# Patient Record
Sex: FEMALE | Race: WHITE
[De-identification: names, ages, dates, MRNs, and addresses within clinical notes are randomized per-mention and may not be internally consistent; named-entity substitution may affect disease eponyms.]

---

## 2017-05-18 ENCOUNTER — HOSPITAL ENCOUNTER (OUTPATIENT)
Dept: HOSPITAL 47 - RADCTMAIN | Age: 62
Discharge: HOME | End: 2017-05-18
Payer: COMMERCIAL

## 2017-05-18 DIAGNOSIS — H92.09: Primary | ICD-10-CM

## 2017-05-18 LAB
BUN SERPL-SCNC: 22 MG/DL (ref 7–17)
NON-AFRICAN AMERICAN GFR(MDRD): >60

## 2017-05-18 PROCEDURE — 70481 CT ORBIT/EAR/FOSSA W/DYE: CPT

## 2017-05-18 PROCEDURE — 36415 COLL VENOUS BLD VENIPUNCTURE: CPT

## 2017-05-18 PROCEDURE — 82565 ASSAY OF CREATININE: CPT

## 2017-05-18 PROCEDURE — 84520 ASSAY OF UREA NITROGEN: CPT

## 2017-05-18 NOTE — CT
EXAMINATION TYPE: CT posterior fossa w con

 

DATE OF EXAM: 5/18/2017 12:11 PM

 

COMPARISON: NONE

 

HISTORY: Otalgia per order. Symptoms of severe pain and pressure for 6 to 7 weeks, side not specified
 with dizziness per patient.

 

CT DLP: 428.50 mGycm.  Automated Exposure Control for Dose Reduction was Utilized.

 

 

TECHNIQUE: CT scan of internal auditory canal is performed with IV contrast, thin cut axial images ar
e obtained, coronal reformatted images are also reviewed.  Patient is injected with 100 cc Omnipaque 
300 for the study.

 

FINDINGS: The external auditory canals are patent bilaterally. Visualized Mastoid air cells show no e
vidence of abnormal opacification bilaterally.  The middle ear ossicles are symmetric and unremarkabl
e.  There is no evidence of suspicious surrounding soft tissue density to suggest cholesteatoma.  The
 scutum is preserved bilaterally.  The cochlea and the semicircular canals are symmetric and unremark
able.  Vestibular aqueduct and internal carotid canal appear unremarkable.  

 

Temporomandibular joints are maintained bilaterally.  Visualized paranasal sinuses are grossly clear.
 Visualized portion brain parenchyma is felt within normal limits. The globes appear within normal li
mits. Intraconal fat is preserved bilaterally.

 

IMPRESSION: Unremarkable study. No significant finding is seen to account for patient's symptoms.

## 2017-11-08 ENCOUNTER — HOSPITAL ENCOUNTER (OUTPATIENT)
Dept: HOSPITAL 47 - RADMAMWWP | Age: 62
Discharge: HOME | End: 2017-11-08
Payer: COMMERCIAL

## 2017-11-08 DIAGNOSIS — Z12.31: Primary | ICD-10-CM

## 2017-11-08 DIAGNOSIS — Z80.3: ICD-10-CM

## 2017-11-08 PROCEDURE — 77063 BREAST TOMOSYNTHESIS BI: CPT

## 2017-11-09 NOTE — MM
Reason for exam: screening  (asymptomatic).

Last mammogram was performed 1 year ago.



History:

Patient is postmenopausal.

Family history of breast cancer in maternal aunt at age 60 and breast cancer in 

maternal grandmother at age 65.

Benign left mammotome panel of the left breast, April 16, 2007.  Reductions of 

both breasts, 2003.



Physical Findings:

A clinical breast exam by your physician is recommended on an annual basis and 

results should be correlated with mammographic findings.



MG 3D Screening Mammo W/Cad

Bilateral CC and MLO view(s) were taken.

Prior study comparison: October 26, 2016, bilateral MG 3d screening mammo w/cad.  

October 13, 2015, bilateral MG diagnostic mammo w CAD JUDY.

There are scattered fibroglandular densities.

Finding: There are round calcifications in both breasts. Previous mammotome biopsy

in the left breast, stable x 2-3 same site. There is no discrete abnormality.





ASSESSMENT: Benign, BI-RAD 2



RECOMMENDATION:

Routine screening mammogram of both breasts in 1 year.

Manage on a clinical basis with regard to nipple discharge. Consider further work 

up or ductogram if spontaneous or bloody.

## 2018-03-12 ENCOUNTER — HOSPITAL ENCOUNTER (OUTPATIENT)
Dept: HOSPITAL 47 - RADUSWWP | Age: 63
Discharge: HOME | End: 2018-03-12
Payer: COMMERCIAL

## 2018-03-12 DIAGNOSIS — R93.8: Primary | ICD-10-CM

## 2018-03-12 DIAGNOSIS — Z87.440: ICD-10-CM

## 2018-03-12 PROCEDURE — 76770 US EXAM ABDO BACK WALL COMP: CPT

## 2018-03-13 NOTE — US
EXAMINATION TYPE: US kidneys/renal and bladder

 

DATE OF EXAM: 3/12/2018

 

COMPARISON: NONE

 

CLINICAL HISTORY: N39.0 FREQ URINATION.

 

EXAM MEASUREMENTS:

 

Right Kidney:  11.1 x 4.6 x  cm

Left Kidney: 10.9 x 5.1 x 4.8 cm

 

 

Patient of large body habitus

 

Right Kidney: Inferior pole obscured by bowel gas, cyst noted measuring 1.5 x 1.6 x 1.5cm 

Left Kidney: Inferior pole obscured by bowel gas  

Bladder: Sonolucent.

**Bilateral Jets seen: No

**Normal Post Void Residual: 234.7, abnormal, normal range <50ml

 

 

 

IMPRESSION:

 

1. Large post void residual.

2. Simple appearing cyst superior pole right kidney, differential would include peripelvic cyst

## 2018-05-08 ENCOUNTER — HOSPITAL ENCOUNTER (OUTPATIENT)
Dept: HOSPITAL 47 - EC | Age: 63
Setting detail: OBSERVATION
LOS: 1 days | Discharge: HOME | End: 2018-05-09
Payer: COMMERCIAL

## 2018-05-08 DIAGNOSIS — E66.9: ICD-10-CM

## 2018-05-08 DIAGNOSIS — I10: ICD-10-CM

## 2018-05-08 DIAGNOSIS — Z86.73: ICD-10-CM

## 2018-05-08 DIAGNOSIS — R07.89: Primary | ICD-10-CM

## 2018-05-08 DIAGNOSIS — Z82.49: ICD-10-CM

## 2018-05-08 DIAGNOSIS — Z88.1: ICD-10-CM

## 2018-05-08 DIAGNOSIS — Z79.51: ICD-10-CM

## 2018-05-08 DIAGNOSIS — Z79.899: ICD-10-CM

## 2018-05-08 DIAGNOSIS — Z80.0: ICD-10-CM

## 2018-05-08 DIAGNOSIS — E78.5: ICD-10-CM

## 2018-05-08 DIAGNOSIS — Z79.82: ICD-10-CM

## 2018-05-08 DIAGNOSIS — Z87.891: ICD-10-CM

## 2018-05-08 DIAGNOSIS — Z83.3: ICD-10-CM

## 2018-05-08 LAB
ALBUMIN SERPL-MCNC: 4.5 G/DL (ref 3.5–5)
ALP SERPL-CCNC: 95 U/L (ref 38–126)
ALT SERPL-CCNC: 23 U/L (ref 9–52)
ANION GAP SERPL CALC-SCNC: 15 MMOL/L
APTT BLD: 28.9 SEC (ref 22–30)
AST SERPL-CCNC: 21 U/L (ref 14–36)
BASOPHILS # BLD AUTO: 0 K/UL (ref 0–0.2)
BASOPHILS NFR BLD AUTO: 0 %
BUN SERPL-SCNC: 13 MG/DL (ref 7–17)
CALCIUM SPEC-MCNC: 10.4 MG/DL (ref 8.4–10.2)
CHLORIDE SERPL-SCNC: 103 MMOL/L (ref 98–107)
CK SERPL-CCNC: 62 U/L (ref 30–135)
CK SERPL-CCNC: 79 U/L (ref 30–135)
CO2 SERPL-SCNC: 26 MMOL/L (ref 22–30)
EOSINOPHIL # BLD AUTO: 0.2 K/UL (ref 0–0.7)
EOSINOPHIL NFR BLD AUTO: 2 %
ERYTHROCYTE [DISTWIDTH] IN BLOOD BY AUTOMATED COUNT: 4.34 M/UL (ref 3.8–5.4)
ERYTHROCYTE [DISTWIDTH] IN BLOOD: 13.5 % (ref 11.5–15.5)
GLUCOSE SERPL-MCNC: 92 MG/DL (ref 74–99)
HCT VFR BLD AUTO: 39 % (ref 34–46)
HGB BLD-MCNC: 13.3 GM/DL (ref 11.4–16)
INR PPP: 1 (ref ?–1.2)
LYMPHOCYTES # SPEC AUTO: 2.2 K/UL (ref 1–4.8)
LYMPHOCYTES NFR SPEC AUTO: 21 %
MAGNESIUM SPEC-SCNC: 2.1 MG/DL (ref 1.6–2.3)
MCH RBC QN AUTO: 30.6 PG (ref 25–35)
MCHC RBC AUTO-ENTMCNC: 34 G/DL (ref 31–37)
MCV RBC AUTO: 90 FL (ref 80–100)
MONOCYTES # BLD AUTO: 0.5 K/UL (ref 0–1)
MONOCYTES NFR BLD AUTO: 4 %
NEUTROPHILS # BLD AUTO: 7.4 K/UL (ref 1.3–7.7)
NEUTROPHILS NFR BLD AUTO: 71 %
PLATELET # BLD AUTO: 262 K/UL (ref 150–450)
POTASSIUM SERPL-SCNC: 4.3 MMOL/L (ref 3.5–5.1)
PROT SERPL-MCNC: 7.7 G/DL (ref 6.3–8.2)
PT BLD: 9.9 SEC (ref 9–12)
SODIUM SERPL-SCNC: 144 MMOL/L (ref 137–145)
TROPONIN I SERPL-MCNC: <0.012 NG/ML (ref 0–0.03)
TROPONIN I SERPL-MCNC: <0.012 NG/ML (ref 0–0.03)
WBC # BLD AUTO: 10.5 K/UL (ref 3.8–10.6)

## 2018-05-08 PROCEDURE — 80053 COMPREHEN METABOLIC PANEL: CPT

## 2018-05-08 PROCEDURE — 93306 TTE W/DOPPLER COMPLETE: CPT

## 2018-05-08 PROCEDURE — 71046 X-RAY EXAM CHEST 2 VIEWS: CPT

## 2018-05-08 PROCEDURE — 93005 ELECTROCARDIOGRAM TRACING: CPT

## 2018-05-08 PROCEDURE — 85730 THROMBOPLASTIN TIME PARTIAL: CPT

## 2018-05-08 PROCEDURE — 96366 THER/PROPH/DIAG IV INF ADDON: CPT

## 2018-05-08 PROCEDURE — 99285 EMERGENCY DEPT VISIT HI MDM: CPT

## 2018-05-08 PROCEDURE — 36415 COLL VENOUS BLD VENIPUNCTURE: CPT

## 2018-05-08 PROCEDURE — 96365 THER/PROPH/DIAG IV INF INIT: CPT

## 2018-05-08 PROCEDURE — 80061 LIPID PANEL: CPT

## 2018-05-08 PROCEDURE — 96376 TX/PRO/DX INJ SAME DRUG ADON: CPT

## 2018-05-08 PROCEDURE — 85025 COMPLETE CBC W/AUTO DIFF WBC: CPT

## 2018-05-08 PROCEDURE — 83735 ASSAY OF MAGNESIUM: CPT

## 2018-05-08 PROCEDURE — 82553 CREATINE MB FRACTION: CPT

## 2018-05-08 PROCEDURE — 84484 ASSAY OF TROPONIN QUANT: CPT

## 2018-05-08 PROCEDURE — 93017 CV STRESS TEST TRACING ONLY: CPT

## 2018-05-08 PROCEDURE — 78452 HT MUSCLE IMAGE SPECT MULT: CPT

## 2018-05-08 PROCEDURE — 82550 ASSAY OF CK (CPK): CPT

## 2018-05-08 PROCEDURE — 85610 PROTHROMBIN TIME: CPT

## 2018-05-08 RX ADMIN — NITROGLYCERIN SCH INCH: 20 OINTMENT TOPICAL at 17:14

## 2018-05-08 RX ADMIN — ACETAMINOPHEN PRN MG: 325 TABLET, FILM COATED ORAL at 17:13

## 2018-05-08 NOTE — XR
EXAMINATION TYPE: XR chest 2V

 

DATE OF EXAM: 5/8/2018

 

COMPARISON: NONE

 

HISTORY: Chest pain

 

TECHNIQUE:  Frontal and lateral views of the chest are obtained.

 

FINDINGS:  

 

There is no focal air space opacity.

 

No evidence for pneumothorax.  No pleural effusion.

 

The cardiac silhouette size is within normal limits.

 

The osseous structures are grossly intact.

 

IMPRESSION:  

 

1.  No acute cardiopulmonary process.

## 2018-05-08 NOTE — ED
General Adult HPI





- General


Chief complaint: Chest Pain


Stated complaint: Chest pain


Time Seen by Provider: 05/08/18 12:40


Source: patient, RN notes reviewed


Mode of arrival: ambulatory


Limitations: no limitations





- History of Present Illness


Initial comments: 





This is a 62-year-old female presents emergency room with a past medical 

history significant for a stroke and hypertension.  Patient presents today 

after seeing her doctor earlier.  Patient comes in with chest pain which 

radiates to her neck arm and back she states it started on Saturday and it 

appears to be getting worse.  Patient does not notice it getting worse with 

exertion she does notice a getting worse when she stands.  Patient denies any 

shortness of breath.  Patient denies any diaphoresis.  Patient denies any 

nausea.  Patient denies abdominal pain patient denies vomiting or diarrhea.  

Patient denies any recent fever chills or cough.  Patient denies being 

lightheaded or dizzy.  Patient denies any headache patient denies numbness or 

weakness.





- Related Data


 Home Medications











 Medication  Instructions  Recorded  Confirmed


 


Aspirin 325 mg PO DAILY 07/04/14 05/08/18


 


Atorvastatin Calcium [Lipitor] 10 mg PO DAILY 07/04/14 05/08/18


 


Cetirizine HCl [Zyrtec] 10 mg PO DAILY 07/04/14 05/08/18


 


Losartan/Hydrochlorothiazide 1 tab PO DAILY 07/04/14 05/08/18





[Hyzaar 50-12.5 Tablet]   


 


Ranitidine HCl [Zantac] 150 mg PO HS 07/04/14 05/08/18


 


Albuterol Nebulized [Ventolin 2.5 mg INHALATION RT-Q6H PRN 05/08/18 05/08/18





Nebulized]   


 


Cranberry Fruit Extract [Cranberry] 200 mg PO DAILY 05/08/18 05/08/18


 


Fluticasone Nasal Spray [Flonase 2 spr EA NOSTRIL DAILY 05/08/18 05/08/18





Nasal Spray]   


 


Furosemide [Lasix] 20 mg PO DAILY PRN 05/08/18 05/08/18


 


Glucosamine-Chondr 500-400Mg 1 tab PO DAILY 05/08/18 05/08/18





[Glucosamine-Chondr-MSM Tab]   


 


Ibuprofen [Motrin] 800 mg PO TID PRN 05/08/18 05/08/18


 


Meclizine [Antivert] 12.5 - 25 mg PO Q6H PRN 05/08/18 05/08/18











 Allergies











Allergy/AdvReac Type Severity Reaction Status Date / Time


 


nitrofurantoin Allergy Severe Anaphylaxis Verified 05/08/18 13:09





[From Macrobid]     


 


nitrofurantoin Allergy Severe Anaphylaxis Verified 05/08/18 13:09





macrocrystalline     





[From Macrobid]     














Review of Systems


ROS Statement: 


Those systems with pertinent positive or pertinent negative responses have been 

documented in the HPI.





ROS Other: All systems not noted in ROS Statement are negative.





Past Medical History


Past Medical History: GERD/Reflux, Hyperlipidemia, Hypertension


History of Any Multi-Drug Resistant Organisms: None Reported


Past Surgical History: Orthopedic Surgery, Tubal Ligation, Uterine Ablation


Past Psychological History: No Psychological Hx Reported


Smoking Status: Former smoker


Past Alcohol Use History: Occasional


Past Drug Use History: None Reported





General Exam





- General Exam Comments


Initial Comments: 





GENERAL:


Patient is well-developed and well-nourished.  Patient is nontoxic and well-

hydrated and is in mild distress.





ENT:


Neck is soft and supple.  No significant lymphadenopathy is noted.  Oropharynx 

is clear.  Moist mucous membranes.  Neck has full range of motion without 

eliciting any pain. 





EYES:


The sclera were anicteric and conjunctiva were pink and moist.  Extraocular 

movements were intact and pupils were equal round and reactive to light.  

Eyelids were unremarkable.





PULMONARY:


Unlabored respirations.  Good breath sounds bilaterally.  No audible rales 

rhonchi or wheezing was noted.





CARDIOVASCULAR:


There is a regular rate and rhythm without any murmurs gallops or rubs.  





ABDOMEN:


Soft and nontender with normal bowel sounds.  No palpable organomegaly was 

noted.  There is no palpable pulsatile mass.





SKIN:


Skin is clear with no lesions or rashes and otherwise unremarkable.





NEUROLOGIC:


Patient is alert and oriented x3.  Cranial nerves II through XII are grossly 

intact.  Motor and sensory are also intact.  Normal speech, volume and content.

  Symmetrical smile. 





MUSCULOSKELETAL:


Normal extremities with adequate strength and full range of motion.  Slight 

edema bilaterally more so on the right





LYMPHATICS:


No significant lymphadenopathy is noted





PSYCHIATRIC:


Normal psychiatric evaluation.  Normal interpersonal interactions appears 

functionally intact in deals appropriately with others.  No signs of 

depression.  


Limitations: no limitations





Course


 Vital Signs











  05/08/18 05/08/18 05/08/18





  12:40 13:13 14:12


 


Temperature 98.8 F  


 


Pulse Rate 84 79 78


 


Respiratory 18 18 18





Rate   


 


Blood Pressure 181/85 179/92 151/82


 


O2 Sat by Pulse 98 97 99





Oximetry   














Medical Decision Making





- Medical Decision Making





EKG shows normal sinus rhythm at 84 bpm ND interval is 160 QRS is 98 QT 

interval 400 QTC is 472.  Patient's EKG shows no ST segment elevation or 

depression or T wave abnormalities are noted.





Patient's chest pain improved with the Nitropaste and aspirin.





Patient's chest x-ray shows no acute abnormality.





I started the patient on heparin because her symptoms are indicative of 

unstable angina.





I spoke with Dr. Mcgrath he agreed to admit the patient I admitted the patient I 

wrote admitting orders and I consult cardiology I continued heparin Nitropaste 

and aspirin on the floor.





- Lab Data


Result diagrams: 


 05/08/18 13:05





 05/08/18 13:05


 Lab Results











  05/08/18 05/08/18 05/08/18 Range/Units





  13:05 13:05 13:05 


 


WBC   10.5   (3.8-10.6)  k/uL


 


RBC   4.34   (3.80-5.40)  m/uL


 


Hgb   13.3   (11.4-16.0)  gm/dL


 


Hct   39.0   (34.0-46.0)  %


 


MCV   90.0   (80.0-100.0)  fL


 


MCH   30.6   (25.0-35.0)  pg


 


MCHC   34.0   (31.0-37.0)  g/dL


 


RDW   13.5   (11.5-15.5)  %


 


Plt Count   262   (150-450)  k/uL


 


Neutrophils %   71   %


 


Lymphocytes %   21   %


 


Monocytes %   4   %


 


Eosinophils %   2   %


 


Basophils %   0   %


 


Neutrophils #   7.4   (1.3-7.7)  k/uL


 


Lymphocytes #   2.2   (1.0-4.8)  k/uL


 


Monocytes #   0.5   (0-1.0)  k/uL


 


Eosinophils #   0.2   (0-0.7)  k/uL


 


Basophils #   0.0   (0-0.2)  k/uL


 


PT     (9.0-12.0)  sec


 


INR     (<1.2)  


 


APTT     (22.0-30.0)  sec


 


Sodium    144  (137-145)  mmol/L


 


Potassium    4.3  (3.5-5.1)  mmol/L


 


Chloride    103  ()  mmol/L


 


Carbon Dioxide    26  (22-30)  mmol/L


 


Anion Gap    15  mmol/L


 


BUN    13  (7-17)  mg/dL


 


Creatinine    0.66  (0.52-1.04)  mg/dL


 


Est GFR (CKD-EPI)AfAm    >90  (>60 ml/min/1.73 sqM)  


 


Est GFR (CKD-EPI)NonAf    >90  (>60 ml/min/1.73 sqM)  


 


Glucose    92  (74-99)  mg/dL


 


Calcium    10.4 H  (8.4-10.2)  mg/dL


 


Magnesium    2.1  (1.6-2.3)  mg/dL


 


Total Bilirubin    0.6  (0.2-1.3)  mg/dL


 


AST    21  (14-36)  U/L


 


ALT    23  (9-52)  U/L


 


Alkaline Phosphatase    95  ()  U/L


 


Total Creatine Kinase  79    ()  U/L


 


CK-MB (CK-2)  0.6    (0.0-2.4)  ng/mL


 


CK-MB (CK-2) Rel Index  0.8    


 


Troponin I  <0.012    (0.000-0.034)  ng/mL


 


Total Protein    7.7  (6.3-8.2)  g/dL


 


Albumin    4.5  (3.5-5.0)  g/dL














  05/08/18 Range/Units





  13:05 


 


WBC   (3.8-10.6)  k/uL


 


RBC   (3.80-5.40)  m/uL


 


Hgb   (11.4-16.0)  gm/dL


 


Hct   (34.0-46.0)  %


 


MCV   (80.0-100.0)  fL


 


MCH   (25.0-35.0)  pg


 


MCHC   (31.0-37.0)  g/dL


 


RDW   (11.5-15.5)  %


 


Plt Count   (150-450)  k/uL


 


Neutrophils %   %


 


Lymphocytes %   %


 


Monocytes %   %


 


Eosinophils %   %


 


Basophils %   %


 


Neutrophils #   (1.3-7.7)  k/uL


 


Lymphocytes #   (1.0-4.8)  k/uL


 


Monocytes #   (0-1.0)  k/uL


 


Eosinophils #   (0-0.7)  k/uL


 


Basophils #   (0-0.2)  k/uL


 


PT  9.9  (9.0-12.0)  sec


 


INR  1.0  (<1.2)  


 


APTT  28.9  (22.0-30.0)  sec


 


Sodium   (137-145)  mmol/L


 


Potassium   (3.5-5.1)  mmol/L


 


Chloride   ()  mmol/L


 


Carbon Dioxide   (22-30)  mmol/L


 


Anion Gap   mmol/L


 


BUN   (7-17)  mg/dL


 


Creatinine   (0.52-1.04)  mg/dL


 


Est GFR (CKD-EPI)AfAm   (>60 ml/min/1.73 sqM)  


 


Est GFR (CKD-EPI)NonAf   (>60 ml/min/1.73 sqM)  


 


Glucose   (74-99)  mg/dL


 


Calcium   (8.4-10.2)  mg/dL


 


Magnesium   (1.6-2.3)  mg/dL


 


Total Bilirubin   (0.2-1.3)  mg/dL


 


AST   (14-36)  U/L


 


ALT   (9-52)  U/L


 


Alkaline Phosphatase   ()  U/L


 


Total Creatine Kinase   ()  U/L


 


CK-MB (CK-2)   (0.0-2.4)  ng/mL


 


CK-MB (CK-2) Rel Index   


 


Troponin I   (0.000-0.034)  ng/mL


 


Total Protein   (6.3-8.2)  g/dL


 


Albumin   (3.5-5.0)  g/dL














Disposition


Clinical Impression: 


 Unstable angina pectoris





Disposition: ADMITTED AS IP TO THIS HOSP


Referrals: 


Amelia Cherry MD [Primary Care Provider] - 1-2 days


Time of Disposition: 14:26

## 2018-05-09 VITALS — DIASTOLIC BLOOD PRESSURE: 65 MMHG | SYSTOLIC BLOOD PRESSURE: 142 MMHG | HEART RATE: 80 BPM | TEMPERATURE: 97.4 F

## 2018-05-09 VITALS — RESPIRATION RATE: 18 BRPM

## 2018-05-09 LAB
CHOLEST SERPL-MCNC: 121 MG/DL (ref ?–200)
CK SERPL-CCNC: 59 U/L (ref 30–135)
HDLC SERPL-MCNC: 49 MG/DL (ref 40–60)
LDLC SERPL CALC-MCNC: 53 MG/DL (ref 0–99)
TRIGL SERPL-MCNC: 96 MG/DL (ref ?–150)
TROPONIN I SERPL-MCNC: <0.012 NG/ML (ref 0–0.03)

## 2018-05-09 RX ADMIN — NITROGLYCERIN SCH: 20 OINTMENT TOPICAL at 00:14

## 2018-05-09 RX ADMIN — ACETAMINOPHEN PRN MG: 325 TABLET, FILM COATED ORAL at 06:22

## 2018-05-09 RX ADMIN — NITROGLYCERIN SCH: 20 OINTMENT TOPICAL at 05:03

## 2018-05-09 NOTE — EST
EXERCISE STRESS



DATE OF SERVICE:

05/09/2018



AGE:

62



SEX:

Female.



HT:

70"



WT:

275 pounds.



PROTOCOL:

Lexiscan Cardiolite.



STAGE:



DURATION OF EXERCISE:



HEART RATE REST:

76



BLOOD PRESSURE REST:

138/91



MAXIMUM HEART RATE ACHIEVED:

100



MAXIMUM BLOOD PRESSURE:

156/90



85% MPHR:

134



100% MPHR:

158



METS:



INDICATIONS:

Chest pain.



CLINICAL INFORMATION:

STRESS DATA:  Pre-testing physical examination showed a heart rate of 76, pressure

138/91 mmHg. Baseline EKG showed sinus mechanism. Lexiscan 0.4 mg was given to the

patient over 15 seconds per protocol. Her maximum heart rate was 100 beats per minute

and maximum pressure was 156/90 mmHg. Clinically the patient did not have any symptoms

of chest pain or discomfort and the EKG did not show any significant ST or T-wave

abnormalities consistent with ischemia.



CONCLUSION:

1. Non-diagnostic electrocardiogram stress testing in response to Lexiscan.

2. Please follow up on the Cardiolite portion on separate report from Radiology

    Department.





MMODL / IJN: [id number] / Job#: [id number]

## 2018-05-09 NOTE — P.CRDCN
History of Present Illness


Consult date: 05/09/18


History of present illness: 


Mrs. Mcclendon is a pleasant 62-year-old  female past medical history 

significant for TIA, dyslipidemia, hypertension and obesity. She denies history 

of coronary artery disease. Her father had bypass in his late 60's/early 70's. 

She has never seen a cardiologist for any reason. We have been asked to see her 

in consultation for chest pain. She states she woke up Saturday morning with a 

pain in the precordial region that was described as an ache. She felt it was 

similar to a gas type pain, which she has experienced in the past. The 

sensation remained localized to the precordial region all of Saturday. However, 

starting Sunday the pain started to radiate down her left arm, through to her 

back, into her neck and left jaw. She denies associated shortness of breath, 

dizziness, palpitations, nausea, vomiting or diaphoresis. She went to see her 

PCP and was sent to the hospital for evaluation. When she arrived here she was 

having the pain and was given aspirin and nitropaste which seemed to relieve 

the pain. She has had no further symptoms of chest pain since admission. 





EKG reveals sinus mechanism with non-specific T-wave abnormalities. 


Chest xray is negative for an acute cardiopulmonary process. 


Laboratory data reviewed,  hemoglobin 13.3, platelets 262, sodium 144, 

potassium 4.3, creatinine 0.66, cardiac enzymes negative 3, LDL 53. 


Current cardiac medications include Hyzaar 50/12.5 mg daily, aspirin 325 mg 

daily, Lasix 20 mg daily as needed and atorvastatin 10 mg daily.








Review of Systems





At the time of my exam:


CONSTITUTIONAL: Denies fever. Denies chills.


EYES: Denies blurred vision. Denies vision changes. Denies eye pain.


EARS, NOSE, MOUTH & THROAT: Denies headache. Denies sore throat. Denies ear 

pain.


CARDIOVASCULAR: Denies chest pain. Denies shortness of breath. Denies 

orthopnea. Denies PND. Denies palpitations.


RESPIRATORY: Denies cough. 


GASTROINTESTINAL: Denies abdominal pain. Denies diarrhea. Denies constipation. 

Denies nausea. Denies vomiting.


MUSCULOSKELETAL: Denies myalgias.


INTEGUMENTARY: Denies pruitis. Denies rash.


NEUROLOGIC: Denies numbness. Denies tingling. Denies weakness.


PSYCHIATRIC: Denies anxiety. Denies depression.


ENDOCRINE: Denies fatigue. Denies weight change. Denies polydipsia. Denies 

polyurina.


GENITOURINARY: Denies burning, hematuria or urgency with micturation.


HEMATOLOGIC: Denies history of anemia. Denies bleeding. 








Past Medical History


Past Medical History: CVA/TIA, GERD/Reflux, Hyperlipidemia, Hypertension


Additional Past Medical History / Comment(s): for past week"hard time swallowing

-food sticks", uti's, past sciatica-had epidural inj x1, upper lt bridge, 

arthritis rt knee,pyloric stenosis as infant(sx done), tia 4-5 years ago, pt 

stated they put me on li[itor as preventative


History of Any Multi-Drug Resistant Organisms: None Reported


Past Surgical History: Breast Surgery, Orthopedic Surgery, Tubal Ligation, 

Uterine Ablation


Additional Past Surgical History / Comment(s): messi knee arthroscopy,lumbar 

epidural inj, coposcopy, leep procedure, colonoscopy, d&c x2, benign lesions 

removed shoulder/neck, breast reduction


Past Anesthesia/Blood Transfusion Reactions: Postoperative Nausea & Vomiting (

PONV)


Additional Past Anesthesia/Blood Transfusion Reaction / Comment(s): difficulty 

waking, mild clausterphobia


Smoking Status: Former smoker





- Past Family History


  ** Father


Family Medical History: Cancer





  ** Mother


Family Medical History: Diabetes Mellitus


Additional Family Medical History / Comment(s): pancreatic cancer





Medications and Allergies


 Home Medications











 Medication  Instructions  Recorded  Confirmed  Type


 


Aspirin 325 mg PO DAILY 07/04/14 05/08/18 History


 


Atorvastatin Calcium [Lipitor] 10 mg PO DAILY 07/04/14 05/08/18 History


 


Cetirizine HCl [Zyrtec] 10 mg PO DAILY 07/04/14 05/08/18 History


 


Losartan/Hydrochlorothiazide 1 tab PO DAILY 07/04/14 05/08/18 History





[Hyzaar 50-12.5 Tablet]    


 


Ranitidine HCl [Zantac] 150 mg PO HS 07/04/14 05/08/18 History


 


Albuterol Nebulized [Ventolin 2.5 mg INHALATION RT-Q6H PRN 05/08/18 05/08/18 

History





Nebulized]    


 


Cranberry Fruit Extract [Cranberry] 200 mg PO DAILY 05/08/18 05/08/18 History


 


Fluticasone Nasal Spray [Flonase 2 spr EA NOSTRIL DAILY 05/08/18 05/08/18 

History





Nasal Spray]    


 


Furosemide [Lasix] 20 mg PO DAILY PRN 05/08/18 05/08/18 History


 


Glucosamine-Chondr 500-400Mg 1 tab PO DAILY 05/08/18 05/08/18 History





[Glucosamine-Chondr-MSM Tab]    


 


Ibuprofen [Motrin] 800 mg PO TID PRN 05/08/18 05/08/18 History


 


Meclizine [Antivert] 12.5 - 25 mg PO Q6H PRN 05/08/18 05/08/18 History











 Allergies











Allergy/AdvReac Type Severity Reaction Status Date / Time


 


nitrofurantoin Allergy Severe Anaphylaxis Verified 05/08/18 13:09





[From Macrobid]     


 


nitrofurantoin Allergy Severe Anaphylaxis Verified 05/08/18 13:09





macrocrystalline     





[From Macrobid]     














Physical Exam


Vitals: 


 Vital Signs











  Temp Pulse Pulse Resp BP BP Pulse Ox


 


 05/09/18 07:25  97.5 F L   67  18   117/67  98


 


 05/09/18 03:57    60  16   


 


 05/09/18 03:39  97.7 F   65  16   120/68  98


 


 05/08/18 23:38  98.1 F   70  16   110/62  97


 


 05/08/18 23:18    71  16   


 


 05/08/18 20:00    68  16   


 


 05/08/18 19:32  97.9 F   82  16   117/60  95


 


 05/08/18 16:00     18   


 


 05/08/18 15:30  97.7 F   87  18   145/59  95


 


 05/08/18 14:12   78   18  151/82   99


 


 05/08/18 13:13   79   18  179/92   97


 


 05/08/18 12:40  98.8 F  84   18  181/85   98








 Intake and Output











 05/08/18 05/09/18 05/09/18





 22:59 06:59 14:59


 


Intake Total 121.667  


 


Balance 121.667  


 


Intake:   


 


  Intake, IV Titration 121.667  





  Amount   


 


    Heparin Sodium,Porcine/ 121.667  





    D5w Pmx 25,000 unit In   





    Dextrose/Water 1 500ml.   





    bag @ 7.96 UNITS/KG/HR 20   





    mls/hr IV .Q24H ALESSANDRA Rx#:   





    [id number]   


 


Other:   


 


  Voiding Method Toilet Toilet 


 


  # Voids  3 


 


  Weight 124.8 kg  














Blood pressure 117/67 heart rate 67 afebrile maintaining oxygen saturation on 

room air


GENERAL: This is a 62-year-old  female in no apparent distress at the 

time of my examination.  Obese.


HEENT: Head is atraumatic, normocephalic. Pupils are equal, round. Sclerae 

anicteric. Conjunctivae are clear. Mucous membranes of the mouth are moist. 

Neck is supple. There is no jugular venous distention. No carotid bruit is 

heard.


LUNGS: Clear to auscultation no wheezes, rales or rhonchi. No chest wall 

tenderness is noted on palpation or with deep breathing.


HEART: Regular rate and rhythm without murmurs, rubs or gallops. S1 and S2 

heard.


ABDOMEN: Soft, nontender. Bowel sounds are heard. No organomegaly noted.


EXTREMITIES: No evidence of peripheral edema and no calf tenderness noted.


VASCULAR: Radial and dorsalis pedis pulses palpated, no evidence of clubbing.  


NEUROLOGIC: Patient is awake, alert and oriented x3.


 








Results





 05/08/18 13:05





 05/08/18 13:05


 Cardiac Enzymes











  05/08/18 05/08/18 05/08/18 Range/Units





  13:05 13:05 19:58 


 


AST   21   (14-36)  U/L


 


CK-MB (CK-2)  0.6   0.3  (0.0-2.4)  ng/mL


 


Troponin I  <0.012   <0.012  (0.000-0.034)  ng/mL














  05/09/18 Range/Units





  00:12 


 


AST   (14-36)  U/L


 


CK-MB (CK-2)  0.4  (0.0-2.4)  ng/mL


 


Troponin I  <0.012  (0.000-0.034)  ng/mL








 Coagulation











  05/08/18 05/08/18 05/09/18 Range/Units





  13:05 19:58 03:22 


 


PT  9.9    (9.0-12.0)  sec


 


APTT  28.9  34.6 H  49.3 H  (22.0-30.0)  sec








 Lipids











  05/09/18 Range/Units





  03:22 


 


Triglycerides  96  (<150)  mg/dL


 


Cholesterol  121  (<200)  mg/dL


 


HDL Cholesterol  49  (40-60)  mg/dL








 CBC











  05/08/18 Range/Units





  13:05 


 


WBC  10.5  (3.8-10.6)  k/uL


 


RBC  4.34  (3.80-5.40)  m/uL


 


Hgb  13.3  (11.4-16.0)  gm/dL


 


Hct  39.0  (34.0-46.0)  %


 


Plt Count  262  (150-450)  k/uL








 Comprehensive Metabolic Panel











  05/08/18 Range/Units





  13:05 


 


Sodium  144  (137-145)  mmol/L


 


Potassium  4.3  (3.5-5.1)  mmol/L


 


Chloride  103  ()  mmol/L


 


Carbon Dioxide  26  (22-30)  mmol/L


 


BUN  13  (7-17)  mg/dL


 


Creatinine  0.66  (0.52-1.04)  mg/dL


 


Glucose  92  (74-99)  mg/dL


 


Calcium  10.4 H  (8.4-10.2)  mg/dL


 


AST  21  (14-36)  U/L


 


ALT  23  (9-52)  U/L


 


Alkaline Phosphatase  95  ()  U/L


 


Total Protein  7.7  (6.3-8.2)  g/dL


 


Albumin  4.5  (3.5-5.0)  g/dL








 Current Medications











Generic Name Dose Route Start Last Admin





  Trade Name Freq  PRN Reason Stop Dose Admin


 


Acetaminophen  650 mg  05/08/18 16:52  05/09/18 06:22





  Tylenol Tab  PO   650 mg





  Q6HR PRN   Administration





  Fever and/ or Pain   


 


Albuterol Sulfate  2.5 mg  05/08/18 20:47  





  Ventolin Nebulized  INHALATION   





  RT-Q6H PRN   





  Shortness Of Breath   


 


Aspirin  325 mg  05/09/18 09:00  





  Aspirin  PO   





  DAILY [facility]   


 


Atorvastatin Calcium  10 mg  05/08/18 21:31  05/08/18 22:25





  Lipitor  PO   10 mg





  HS ALESSANDRA   Administration


 


Famotidine  20 mg  05/08/18 21:00  05/08/18 22:25





  Pepcid  PO   Not Given





  HS ALESSANDRA   


 


Fluticasone Propionate  2 spray  05/09/18 09:00  





  Flonase Nasal Spray  EA NOSTRIL   





  DAILY [facility]   


 


Furosemide  20 mg  05/08/18 20:47  





  Lasix  PO   





  DAILY PRN   





  SWELLING   


 


HCTZ/Losartan Potassium  1 each  05/09/18 09:00  





  Hyzaar 50-12.5  PO   





  DAILY [facility]   


 


Heparin Sodium/Dextrose 25,000  500 mls @ 20 mls/hr  05/08/18 14:30  05/08/18 20

:50





  unit/ IV Solution  IV   10.94 units/kg/hr





  .Q24H ALESSANDRA   27.5 mls/hr





  Protocol   Titration





  7.96 UNITS/KG/HR   


 


Ibuprofen  800 mg  05/08/18 20:47  05/08/18 21:55





  Motrin  PO   800 mg





  TID PRN   Administration





  Pain   


 


Loratadine  10 mg  05/09/18 09:00  





  Claritin  PO   





  DAILY ALESSANDRA   


 


Meclizine HCl  25 mg  05/08/18 20:47  





  Antivert  PO   





  Q6H PRN   





  Vertigo   


 


Nitroglycerin  1 inch  05/08/18 18:00  05/09/18 05:03





  Nitro-Bid Oint  TOPICAL   Not Given





  Q6HR ALESSANDRA   


 


Nitroglycerin  0.4 mg  05/08/18 14:26  





  Nitrostat  SUBLINGUAL   





  Q5M PRN   





  Chest Pain   


 


Zolpidem Tartrate  5 mg  05/08/18 20:42  





  Ambien  PO   





  HS PRN   





  Insomnia   








 Intake and Output











 05/08/18 05/09/18 05/09/18





 22:59 06:59 14:59


 


Intake Total 121.667  


 


Balance 121.667  


 


Intake:   


 


  Intake, IV Titration 121.667  





  Amount   


 


    Heparin Sodium,Porcine/ 121.667  





    D5w Pmx 25,000 unit In   





    Dextrose/Water 1 500ml.   





    bag @ 7.96 UNITS/KG/HR 20   





    mls/hr IV .Q24H ALESSANDRA Rx#:   





    [id number]   


 


Other:   


 


  Voiding Method Toilet Toilet 


 


  # Voids  3 


 


  Weight 124.8 kg  








 





 05/08/18 13:05 





 05/08/18 13:05 











Assessment and Plan


Assessment: 





ASSESSMENT


1.  Chest pain, atypical.  An acute coronary event has been ruled out.


2.  Hypertension


3.  Dyslipidemia


4.  History of TIA


5.  Obesity


6.  Family history of coronary artery disease





PLAN


Obtain 2-D echocardiogram and Doppler study to assess cardiac structure and 

function.


Perform Lexiscan stress test to assess for stress-induced reversible cardiac 

ischemia


If above diagnostics testing is normal she is stable from a cardiac perspective.


Follow-up with Dr. Ortega in 2-3 weeks.


Thank you kindly for this consultation.





Nurse Practitioner note has been reviewed, I agree with a documented findings 

and plan of care.  Patient was seen and examined.

## 2018-05-09 NOTE — P.DS
Providers


Date of admission: 


05/08/18 14:26





Expected date of discharge: 05/09/18


Attending physician: 


Xena Mcgrath





Consults: 





 





05/08/18 14:26


Consult Physician Urgent 


   Consulting Provider: [facility]


   Consult Reason/Comments: Unstable angina


   Do you want consulting provider notified?: Yes











Primary care physician: 


[facility] Course: 





This is a 62-year-old female with past medical history significant for 

essential hypertension, hyperlipidemia, and history of TIA who presented to the 

emergency room with chest pain.  Patient said that her pain started all of a 

sudden as achy sensation mostly in the left side of her chest.  Initially.  She 

thought it might be more of a gaseous discomfort that afterward she noted that 

the pain is radiating to her left arm.  She denies any shortness of breath, 

dizziness, or diaphoresis.  She presented to the emergency room and twelve-lead 

EKG showed no acute ischemic changes.  Serial troponin were negative 3 sets.  

Patient was placed for observation and was seen and evaluated by cardiology.  

She underwent a Lexiscan scan stress test that was negative.  She is a 

nonsmoker.  Cholesterol level at goal.  She will be discharged home in a stable 

condition.  She will follow-up with her PCP as directed.





Plan - Discharge Summary


Discharge Rx Participant: No


New Discharge Prescriptions: 


No Action


   Aspirin 325 mg PO DAILY


   Ranitidine HCl [Zantac] 150 mg PO HS


   Losartan/Hydrochlorothiazide [Hyzaar 50-12.5 Tablet] 1 tab PO DAILY


   Cetirizine HCl [Zyrtec] 10 mg PO DAILY


   Atorvastatin Calcium [Lipitor] 10 mg PO DAILY


   Glucosamine-Chondr 500-400Mg [Glucosamine-Chondr-MSM Tab] 1 tab PO DAILY


   Fluticasone Nasal Spray [Flonase Nasal Spray] 2 spr EA NOSTRIL DAILY


   Albuterol Nebulized [Ventolin Nebulized] 2.5 mg INHALATION RT-Q6H PRN


     PRN Reason: Shortness Of Breath


   Meclizine [Antivert] 12.5 - 25 mg PO Q6H PRN


     PRN Reason: Vertigo


   Furosemide [Lasix] 20 mg PO DAILY PRN


     PRN Reason: SWELLING


   Ibuprofen [Motrin] 800 mg PO TID PRN


     PRN Reason: Pain


   Cranberry Fruit Extract [Cranberry] 200 mg PO DAILY


Discharge Medication List





Aspirin 325 mg PO DAILY 07/04/14 [History]


Atorvastatin Calcium [Lipitor] 10 mg PO DAILY 07/04/14 [History]


Cetirizine HCl [Zyrtec] 10 mg PO DAILY 07/04/14 [History]


Losartan/Hydrochlorothiazide [Hyzaar 50-12.5 Tablet] 1 tab PO DAILY 07/04/14 [

History]


Ranitidine HCl [Zantac] 150 mg PO HS 07/04/14 [History]


Albuterol Nebulized [Ventolin Nebulized] 2.5 mg INHALATION RT-Q6H PRN 05/08/18 [

History]


Cranberry Fruit Extract [Cranberry] 200 mg PO DAILY 05/08/18 [History]


Fluticasone Nasal Spray [Flonase Nasal Spray] 2 spr EA NOSTRIL DAILY 05/08/18 [

History]


Furosemide [Lasix] 20 mg PO DAILY PRN 05/08/18 [History]


Glucosamine-Chondr 500-400Mg [Glucosamine-Chondr-MSM Tab] 1 tab PO DAILY 05/08/ 18 [History]


Ibuprofen [Motrin] 800 mg PO TID PRN 05/08/18 [History]


Meclizine [Antivert] 12.5 - 25 mg PO Q6H PRN 05/08/18 [History]








Follow up Appointment(s)/Referral(s): 


Amelia Cherry MD [Primary Care Provider] - 1-2 days

## 2018-05-09 NOTE — NM
EXAMINATION TYPE: NM stress lexiscan cardiolite

 

DATE OF EXAM: 5/9/2018

 

COMPARISON: NONE

 

HISTORY: Chest pain, hypertension, and hyperlipidemia

 

TECHNIQUE:  After the intravenous administration of 9.82 mCi Tc 99m Sestamibi - Cardiolite resting SP
ECT images acquired 45 minutes post injection. 

 

The patient received 0.4mg Lexiscan, 24.3 mCi Tc 99m Sestamibi - Stress images obtained 30 minutes po
st injection 

 

FINDINGS:  

 

Review of stress and rest SPECT images demonstrates no distinct perfusion abnormality.  No fixed perf
usion abnormality is seen. Gated analysis shows normal wall motion with an estimated left ventricular
 ejection fraction of 63 % at stress and 54% at rest. TID is calculated within normal limits at 0.91.


 

IMPRESSION:  

No scintigraphic evidence for reversible ischemia.

## 2018-05-09 NOTE — ECHOF
Referral Reason:cp



MEASUREMENTS

--------

HEIGHT: 152.4 cm

WEIGHT: 124.7 kg

BP: 

RVIDd:   2.8 cm     (< 3.3)

IVSd:   1.0 cm     (0.6 - 1.1)

LVIDd:   5.4 cm     (3.9 - 5.3)

LVPWd:   1.0 cm     (0.6 - 1.1)

IVSs:   1.3 cm

LVIDs:   4.0 cm

LVPWs:   1.4 cm

LA Diam:   3.8 cm     (2.7 - 3.8)

LAESV Index (A-L):   25.60 ml/m

Ao Diam:   3.2 cm     (2.0 - 3.7)

AV Cusp:   2.1 cm     (1.5 - 2.6)

LA Diam:   3.9 cm     (2.7 - 3.8)

MV EXCURSION:   17.354 mm     (> 18.000)

MV EF SLOPE:   103 mm/s     (70 - 150)

EPSS:   0.3 cm

MV E Chuy:   0.78 m/s

MV DecT:   208 ms

MV A Chuy:   1.19 m/s

MV E/A Ratio:   0.66 

RAP:   5.00 mmHg

RVSP:   15.93 mmHg







FINDINGS

--------

Sinus rhythm.

This was a technically adequate study.

LV size, wall thickness and systolic function are normal, with an EF greater than 55%.   The left bravo
tricular size is normal.

The right ventricle is normal in size.

The left atrial size is normal.    Normal LA  size by volume 22+/-6 ml/m2.

The right atrial size is normal.

The aortic valve is trileaflet, and appears structurally normal. No aortic stenosis or regurgitation.


Mild mitral regurgitation is present.

Mild tricuspid regurgitation present.   There is no evidence of pulmonary hypertension.   The right v
entricular systolic pressure, as measured by Doppler, is 15.93mmHg.

There is no pulmonic regurgitation present.

The aortic root size is normal.

There is no pericardial effusion.



CONCLUSIONS

--------

1. LV size, wall thickness and systolic function are normal, with an EF greater than 55%.

2. The left ventricular size is normal.

3. Normal LA size by volume 22+/-6 ml/m2.

4. The aortic valve is trileaflet, and appears structurally normal. No aortic stenosis or regurgitati
on.

5. Mild mitral regurgitation is present.

6. Mild tricuspid regurgitation present.

7. There is no evidence of pulmonary hypertension.

8. The right ventricular systolic pressure, as measured by Doppler, is 15.93mmHg.

9. There is no pulmonic regurgitation present.

10. The aortic root size is normal.

11. There is no pericardial effusion.





SONOGRAPHER: Elyssa Husain RDCS

## 2018-05-09 NOTE — P.HPIM
History of Present Illness


H&P Date: 05/09/18


Chief Complaint: Chest pain





This is a 62-year-old female with past medical history significant for 

essential hypertension, hyperlipidemia, and history of TIA who presented to the 

emergency room with chest pain.  Patient said that her pain started all of a 

sudden as achy sensation mostly in the left side of her chest.  Initially.  She 

thought it might be more of a gaseous discomfort that afterward she noted that 

the pain is radiating to her left arm.  She denies any shortness of breath, 

dizziness, or diaphoresis.  She presented to the emergency room and twelve-lead 

EKG showed no acute ischemic changes.  Serial troponin were negative 3 sets.  

Patient was placed for observation and was seen and evaluated by cardiology.  

She underwent a Lexiscan scan stress test that was negative.  She is a 

nonsmoker.  Cholesterol level at goal.  She will be discharged home in a stable 

condition.  She will follow-up with her PCP as directed.





Review of Systems





Review of system:


14 points review of systems were obtained and were negative except to what were 

mentioned in the HPI.





Past Medical History


Past Medical History: CVA/TIA, GERD/Reflux, Hyperlipidemia, Hypertension


Additional Past Medical History / Comment(s): for past week"hard time swallowing

-food sticks", uti's, past sciatica-had epidural inj x1, upper lt bridge, 

arthritis rt knee,pyloric stenosis as infant(sx done), tia 4-5 years ago, pt 

stated they put me on li[itor as preventative


History of Any Multi-Drug Resistant Organisms: None Reported


Past Surgical History: Breast Surgery, Orthopedic Surgery, Tubal Ligation, 

Uterine Ablation


Additional Past Surgical History / Comment(s): messi knee arthroscopy,lumbar 

epidural inj, coposcopy, leep procedure, colonoscopy, d&c x2, benign lesions 

removed shoulder/neck, breast reduction


Past Anesthesia/Blood Transfusion Reactions: Postoperative Nausea & Vomiting (

PONV)


Additional Past Anesthesia/Blood Transfusion Reaction / Comment(s): difficulty 

waking, mild clausterphobia


Smoking Status: Former smoker





- Past Family History


  ** Father


Family Medical History: Cancer





  ** Mother


Family Medical History: Diabetes Mellitus


Additional Family Medical History / Comment(s): pancreatic cancer





Medications and Allergies


 Home Medications











 Medication  Instructions  Recorded  Confirmed  Type


 


Aspirin 325 mg PO DAILY 07/04/14 05/08/18 History


 


Atorvastatin Calcium [Lipitor] 10 mg PO DAILY 07/04/14 05/08/18 History


 


Cetirizine HCl [Zyrtec] 10 mg PO DAILY 07/04/14 05/08/18 History


 


Losartan/Hydrochlorothiazide 1 tab PO DAILY 07/04/14 05/08/18 History





[Hyzaar 50-12.5 Tablet]    


 


Ranitidine HCl [Zantac] 150 mg PO HS 07/04/14 05/08/18 History


 


Albuterol Nebulized [Ventolin 2.5 mg INHALATION RT-Q6H PRN 05/08/18 05/08/18 

History





Nebulized]    


 


Cranberry Fruit Extract [Cranberry] 200 mg PO DAILY 05/08/18 05/08/18 History


 


Fluticasone Nasal Spray [Flonase 2 spr EA NOSTRIL DAILY 05/08/18 05/08/18 

History





Nasal Spray]    


 


Furosemide [Lasix] 20 mg PO DAILY PRN 05/08/18 05/08/18 History


 


Glucosamine-Chondr 500-400Mg 1 tab PO DAILY 05/08/18 05/08/18 History





[Glucosamine-Chondr-MSM Tab]    


 


Ibuprofen [Motrin] 800 mg PO TID PRN 05/08/18 05/08/18 History


 


Meclizine [Antivert] 12.5 - 25 mg PO Q6H PRN 05/08/18 05/08/18 History











 Allergies











Allergy/AdvReac Type Severity Reaction Status Date / Time


 


nitrofurantoin Allergy Severe Anaphylaxis Verified 05/08/18 13:09





[From Macrobid]     


 


nitrofurantoin Allergy Severe Anaphylaxis Verified 05/08/18 13:09





macrocrystalline     





[From Macrobid]     














Physical Exam


Vitals: 


 Vital Signs











  Temp Pulse Pulse Resp BP BP Pulse Ox


 


 05/09/18 11:35  97.4 F L   80  18   142/65  96


 


 05/09/18 07:25  97.5 F L   67  18   117/67  98


 


 05/09/18 03:57    60  16   


 


 05/09/18 03:39  97.7 F   65  16   120/68  98


 


 05/08/18 23:38  98.1 F   70  16   110/62  97


 


 05/08/18 23:18    71  16   


 


 05/08/18 20:00    68  16   


 


 05/08/18 19:32  97.9 F   82  16   117/60  95


 


 05/08/18 16:00     18   


 


 05/08/18 15:30  97.7 F   87  18   145/59  95


 


 05/08/18 14:12   78   18  151/82   99


 


 05/08/18 13:13   79   18  179/92   97








 Intake and Output











 05/08/18 05/09/18 05/09/18





 22:59 06:59 14:59


 


Intake Total 121.667  


 


Balance 121.667  


 


Intake:   


 


  Intake, IV Titration 121.667  





  Amount   


 


    Heparin Sodium,Porcine/ 121.667  





    D5w Pmx 25,000 unit In   





    Dextrose/Water 1 500ml.   





    bag @ 7.96 UNITS/KG/HR 20   





    mls/hr IV .Q24H [facility] Rx#:   





    [id number]   


 


Other:   


 


  Voiding Method Toilet Toilet 


 


  # Voids  3 


 


  Weight 124.8 kg  














General:  The patient is awake and alert, in no distress


Eye: there is normal conjunctiva bilaterally.  


Neck:  The neck is supple, there is no  JVD.  


Cardiovascular:  Normal  S1-S2, no S3-S4, no murmurs.


Respiratory: Lungs clear to auscultation bilaterally


Gastrointestinal: Abdomen is soft, nontender


Musculoskeletal:  There is no pedal edema.  


Neurological:. Speech is normal. 


Skin:  Skin is warm and dry 





Results


CBC & Chem 7: 


 05/08/18 13:05





 05/08/18 13:05


Labs: 


 Abnormal Lab Results - Last 24 Hours (Table)











  05/08/18 05/08/18 05/09/18 Range/Units





  13:05 19:58 03:22 


 


APTT   34.6 H  49.3 H  (22.0-30.0)  sec


 


Calcium  10.4 H    (8.4-10.2)  mg/dL














Thrombosis Risk Factor Assmnt





- Choose All That Apply


Each Factor Represents 1 point: Obesity (BMI >25)


Each Risk Factor Represents 2 Points: Age 61-74 years


Thrombosis Risk Factor Assessment Total Risk Factor Score: 3


Thrombosis Risk Factor Assessment Level: Moderate Risk





Assessment and Plan


Assessment: 





This is a 62-year-old female with past medical history significant for 

essential hypertension, hyperlipidemia, and history of TIA who presented to the 

emergency room with chest pain.  Patient said that her pain started all of a 

sudden as achy sensation mostly in the left side of her chest.  Initially.  She 

thought it might be more of a gaseous discomfort that afterward she noted that 

the pain is radiating to her left arm.  She denies any shortness of breath, 

dizziness, or diaphoresis.  She presented to the emergency room and twelve-lead 

EKG showed no acute ischemic changes.  Serial troponin were negative 3 sets.  

Patient was placed for observation and was seen and evaluated by cardiology.  

She underwent a Lexiscan scan stress test that was negative.  She is a 

nonsmoker.  Cholesterol level at goal.  She will be discharged home in a stable 

condition.  She will follow-up with her PCP as directed.

## 2018-09-25 ENCOUNTER — HOSPITAL ENCOUNTER (OUTPATIENT)
Dept: HOSPITAL 47 - ORWHC2ENDO | Age: 63
Discharge: HOME | End: 2018-09-25
Payer: COMMERCIAL

## 2018-09-25 VITALS — RESPIRATION RATE: 18 BRPM

## 2018-09-25 VITALS — TEMPERATURE: 98 F

## 2018-09-25 VITALS — BODY MASS INDEX: 37.3 KG/M2

## 2018-09-25 VITALS — HEART RATE: 66 BPM | SYSTOLIC BLOOD PRESSURE: 125 MMHG | DIASTOLIC BLOOD PRESSURE: 82 MMHG

## 2018-09-25 DIAGNOSIS — Z79.899: ICD-10-CM

## 2018-09-25 DIAGNOSIS — Z88.1: ICD-10-CM

## 2018-09-25 DIAGNOSIS — Z79.51: ICD-10-CM

## 2018-09-25 DIAGNOSIS — K29.50: ICD-10-CM

## 2018-09-25 DIAGNOSIS — M19.90: ICD-10-CM

## 2018-09-25 DIAGNOSIS — Z86.73: ICD-10-CM

## 2018-09-25 DIAGNOSIS — K44.9: ICD-10-CM

## 2018-09-25 DIAGNOSIS — K21.0: ICD-10-CM

## 2018-09-25 DIAGNOSIS — Z79.82: ICD-10-CM

## 2018-09-25 DIAGNOSIS — Z79.1: ICD-10-CM

## 2018-09-25 DIAGNOSIS — K22.2: Primary | ICD-10-CM

## 2018-09-25 DIAGNOSIS — I10: ICD-10-CM

## 2018-09-25 PROCEDURE — 43249 ESOPH EGD DILATION <30 MM: CPT

## 2018-09-25 PROCEDURE — 43239 EGD BIOPSY SINGLE/MULTIPLE: CPT

## 2018-09-25 PROCEDURE — 88305 TISSUE EXAM BY PATHOLOGIST: CPT

## 2018-09-25 NOTE — P.PCN
Date of Procedure: 09/25/18


Procedure(s) Performed: 


Procedure: Esophagogastroduodenoscopy and biopsy and esophageal dilation using 

the Microvasive through-the-scope balloon dilator size 18-20 mm.





Preoperative diagnosis: Dysphagia and atypical chest pain.





Postoperative diagnosis: 1. Hiatal hernia and Schatzki B ring dilated up to 20 

mm.  2. Mild antral gastritis.  3. Multiple biopsies obtained from the duodenum

, antrum and esophagus.





Preparation and sedation: Was provided by anesthesia.





Brief clinical history: The patient is a 63-year-old female who gives history 

of intermittent dysphagia over the last 1-1/2-2 years.  In addition, for the 

last few months, she has been having pressure sensation in the chest and 

atypical chest pains.  She was recently hospitalized and had negative workup.  

This evaluation is to assess for esophagitis, complicated reflux disease or 

other pathology.





Procedure: With the patient on her left lateral decubitus position and after 

informed consent and adequate sedation, I passed the Olympus- video 

upper endoscope through the cricopharyngeus down the esophagus.  GE junction 

was around 38 cm from the incisors and there was a sliding hiatal hernia 

measuring around 2 cm.  The esophagus did not show any obvious esophagitis or 

Malik's esophagus.  There was a Schatzki B ring at the level of the GE 

junction which was not impeding the advancement of the endoscope but could be 

responsible for her symptoms.  The endoscope was then passed into the stomach 

which was insufflated with air and inspected in detail including the retroflex 

view in the cardia.  There was some minimal mottling and erythema in the antrum 

but no ulcers or erosions.  Pyloric channel, duodenal bulb, post bulbar area 

and descending duodenum appeared within normal limits.  At this point, I 

obtained biopsies from the duodenum, antrum and esophagus then the endoscope 

was positioned in the distal esophagus at the level of the B ring.  The 

Microvasive through-the-scope balloon dilator size 18-20 mm was advanced 

through the operating channel of the endoscope and was centered at the level of 

the ring and was inflated in a stepwise fashion first to 18 then to 19 and 

finally to 20 mm.  The patient had satisfactory dilation and no immediate 

complications.





The patient tolerated the procedure well.





Plan: The patient was reassured.  She will stay on liquid diet today, her diet 

will then be advanced as tolerated.  Further plans can be made based on her 

course and biopsy results.  I will be happy to see in the office if her 

symptoms persist.  She will follow-up with you as planned.

## 2022-04-13 ENCOUNTER — HOSPITAL ENCOUNTER (EMERGENCY)
Dept: HOSPITAL 47 - EC | Age: 67
Discharge: HOME | End: 2022-04-13
Payer: MEDICARE

## 2022-04-13 VITALS — DIASTOLIC BLOOD PRESSURE: 72 MMHG | HEART RATE: 75 BPM | SYSTOLIC BLOOD PRESSURE: 134 MMHG

## 2022-04-13 VITALS — TEMPERATURE: 98.3 F | RESPIRATION RATE: 18 BRPM

## 2022-04-13 DIAGNOSIS — Z79.82: ICD-10-CM

## 2022-04-13 DIAGNOSIS — I10: ICD-10-CM

## 2022-04-13 DIAGNOSIS — Z87.440: ICD-10-CM

## 2022-04-13 DIAGNOSIS — Z87.891: ICD-10-CM

## 2022-04-13 DIAGNOSIS — M17.11: Primary | ICD-10-CM

## 2022-04-13 DIAGNOSIS — K21.9: ICD-10-CM

## 2022-04-13 DIAGNOSIS — Z98.51: ICD-10-CM

## 2022-04-13 DIAGNOSIS — E78.5: ICD-10-CM

## 2022-04-13 DIAGNOSIS — Z86.73: ICD-10-CM

## 2022-04-13 DIAGNOSIS — M19.90: ICD-10-CM

## 2022-04-13 PROCEDURE — 73562 X-RAY EXAM OF KNEE 3: CPT

## 2022-04-13 PROCEDURE — 96372 THER/PROPH/DIAG INJ SC/IM: CPT

## 2022-04-13 PROCEDURE — 93971 EXTREMITY STUDY: CPT

## 2022-04-13 PROCEDURE — 99284 EMERGENCY DEPT VISIT MOD MDM: CPT

## 2022-04-13 PROCEDURE — 72110 X-RAY EXAM L-2 SPINE 4/>VWS: CPT

## 2022-04-13 NOTE — ED
General Adult HPI





- General


Chief complaint: Extremity Problem,Nontraumatic


Stated complaint: leg pain


Time Seen by Provider: 04/13/22 08:09


Source: patient, RN notes reviewed, old records reviewed


Mode of arrival: wheelchair


Limitations: no limitations





- History of Present Illness


Initial comments: 





66-year-old female presenting with right knee pain.  Pain began yesterday 

without a known trauma.  Patient states she has had some intermittent shooting 

pains down the right side of her leg over the past several weeks.  This pain is 

located predominantly on the right lateral knee.  Worse with standing, worse 

with movement.  No fevers.  No chest pain or dyspnea.





- Related Data


                                Home Medications











 Medication  Instructions  Recorded  Confirmed


 


Aspirin 325 mg PO DAILY 07/04/14 04/13/22


 


Cetirizine HCl [Zyrtec] 10 mg PO DAILY 07/04/14 04/13/22


 


Glucosam/Chond/Hyalu/Cf Borate 1 tab PO DAILY 07/09/18 04/13/22





[Move Free Joint Health Tablet]   


 


Multivitamins, Thera [Multivitamin 1 tab PO DAILY 09/21/18 04/13/22





(formulary)]   


 


Losartan Potassium [Cozaar] 50 mg PO HS 03/11/22 04/13/22


 


Rosuvastatin [Crestor] 10 mg PO HS 03/11/22 04/13/22


 


hydroCHLOROthiazide [Hydrodiuril] 12.5 mg PO DAILY 03/11/22 04/13/22


 


Cholecalciferol [Vitamin D3 (125 125 mcg PO DAILY 04/13/22 04/13/22





Mcg = 5000 Iu)]   


 


Cranberry Fruit Extract [Cranberry] 500 mg PO DAILY 04/13/22 04/13/22








                                  Previous Rx's











 Medication  Instructions  Recorded


 


HYDROcodone/APAP 5-325MG [Norco 1 tab PO Q6HR PRN #12 tab 04/13/22





5-325]  


 


Ibuprofen [Motrin] 600 mg PO Q8HR PRN #24 tab 04/13/22











                                    Allergies











Allergy/AdvReac Type Severity Reaction Status Date / Time


 


nitrofurantoin Allergy Severe Anaphylaxis Verified 04/13/22 10:01





[From Macrobid]     


 


nitrofurantoin Allergy Severe Anaphylaxis Verified 04/13/22 10:01





macrocrystalline     





[From Macrobid]     














Review of Systems


ROS Statement: 


Those systems with pertinent positive or pertinent negative responses have been 

documented in the HPI.





ROS Other: All systems not noted in ROS Statement are negative.





Past Medical History


Past Medical History: Cancer, CVA/TIA, GERD/Reflux, Hyperlipidemia, 

Hypertension, Osteoarthritis (OA), Pneumonia


Additional Past Medical History / Comment(s): occasional dysphagia casandra. 

w/chicken, Hx of UTI's, sciatica, TIA 4-5 years ago-no residual effects, Pneumon

ia several yrs ago. seasonal allergies, malignant melanoma right arm


History of Any Multi-Drug Resistant Organisms: None Reported


Past Surgical History: Breast Surgery, Orthopedic Surgery, Tubal Ligation, 

Uterine Ablation


Additional Past Surgical History / Comment(s): Pyloric stenosis as infant, 

Bilateral knee arthroscopy, lumbar epidural injection, colposcopy, leep 

procedure, colonoscopy, D&C X2, benign lesions removed from shoulder/neck, 

breast reduction. melanoma removed right upper arm & removal of lymph nodes 2020


Past Anesthesia/Blood Transfusion Reactions: Postoperative Nausea & Vomiting 

(PONV)


Additional Past Anesthesia/Blood Transfusion Reaction / Comment(s): Difficulty 

waking, mild claustrophobia.


Past Psychological History: No Psychological Hx Reported


Smoking Status: Former smoker





- Past Family History


  ** Father


Family Medical History: Cancer





  ** Mother


Family Medical History: Cancer, Diabetes Mellitus


Additional Family Medical History / Comment(s): pancreatic cancer





General Exam


Limitations: no limitations


General appearance: alert, in no apparent distress


Head exam: Present: atraumatic, normocephalic


Eye exam: Present: normal appearance, PERRL


ENT exam: Present: normal exam


Neck exam: Present: normal inspection.  Absent: tenderness, meningismus


Respiratory exam: Present: normal lung sounds bilaterally.  Absent: respiratory 

distress, wheezes


Cardiovascular Exam: Present: regular rate, normal rhythm


GI/Abdominal exam: Present: soft.  Absent: distended, tenderness, guarding


Extremities exam: Present: joint swelling (WBC is a small effusion right knee, 

the right foot has both DP and posterior tibial pulses 2+.  There is no 

erythema, no warmth to the joint.  No skin changes.)


Neurological exam: Present: alert, oriented X3


Psychiatric exam: Present: normal affect, normal mood


Skin exam: Present: warm, dry, intact





Course


                                   Vital Signs











  04/13/22 04/13/22





  08:05 10:07


 


Temperature 98.3 F 


 


Pulse Rate 81 72


 


Respiratory 18 18





Rate  


 


Blood Pressure 145/83 135/75


 


O2 Sat by Pulse 97 98





Oximetry  














Medical Decision Making





- Medical Decision Making





66-year-old female presents for evaluation of right knee pain no specific 

injury.  Patient has x-ray evidence of osteoarthritis and joint space narrowing.

 I did also perform ultrasound in the emergency department to rule out DVT this 

is negative.  X-ray lumbar spine shows degenerative changes without acute 

fracture.  Patient feeling better after Toradol.  She's given referral to [facility].  She will do a course of nonsteroidal anti-inflammatories.  

Additionally she is given return parameters for any increased pain or swelling 

or the development of fever.





Disposition


Clinical Impression: 


 Knee pain, acute





Disposition: HOME SELF-CARE


Condition: Good


Instructions (If sedation given, give patient instructions):  Knee Pain (ED)


Prescriptions: 


Ibuprofen [Motrin] 600 mg PO Q8HR PRN #24 tab


 PRN Reason: Pain


HYDROcodone/APAP 5-325MG [Norco 5-325] 1 tab PO Q6HR PRN #12 tab


 PRN Reason: Pain


Is patient prescribed a controlled substance at d/c from ED?: No


Referrals: 


Amelia Cherry MD [Primary Care Provider] - 1-2 days


Time of Disposition: 10:40

## 2022-04-13 NOTE — XR
EXAMINATION TYPE: XR knee complete RT

 

DATE OF EXAM: 4/13/2022

 

COMPARISON: None

 

HISTORY: Pain

 

TECHNIQUE: Right knee is examined in 3 projections.

 

FINDINGS: There is loss of the medial compartment joint space. Large medial femoral condylar spur is 
present. A small medial tibial plateau spur is present. Lateral compartment joint space appears prese
rved. No joint effusion is evident. Patellofemoral joint space narrowing is present. Posterior patell
ar spurs are noted.

 

Follow up exams can be performed 7-10 days from acute trauma for continued pain.

 

IMPRESSION:

1.  Moderate degenerative changes predominantly within the medial compartment right knee.

## 2022-04-13 NOTE — ED
Disposition


Clinical Impression: 


 Knee pain, acute





Disposition: HOME SELF-CARE


Condition: Good


Instructions (If sedation given, give patient instructions):  Knee Pain (ED)


Prescriptions: 


Ibuprofen [Motrin] 600 mg PO Q8HR PRN #24 tab


 PRN Reason: Pain


HYDROcodone/APAP 5-325MG [Norco 5-325] 1 tab PO Q6HR PRN #12 tab


 PRN Reason: Pain


Is patient prescribed a controlled substance at d/c from ED?: No


Referrals: 


Amelia Cherry MD [Primary Care Provider] - 1-2 days


Ross Humphries MD [STAFF PHYSICIAN] - 1-2 days

## 2022-04-13 NOTE — US
EXAMINATION TYPE: US venous doppler duplex LE RT

 

DATE OF EXAM: 4/13/2022 9:21 AM

 

COMPARISON: NONE

 

CLINICAL HISTORY: pain. Right knee pain

 

SIDE PERFORMED: Right  

 

TECHNIQUE:  The lower extremity deep venous system is examined utilizing real time linear array sonog
jewels with graded compression, doppler sonography and color-flow sonography.

 

VESSELS IMAGED:

Common Femoral Vein

Deep Femoral Vein

Greater Saphenous Vein *

Femoral Vein

Popliteal Vein

Proximal Calf Veins

(* superficial vessels)

 

Right Leg:  Negative for DVT

 

IMPRESSION: No evidence of DVT of the right lower extremity.

## 2022-04-13 NOTE — XR
EXAMINATION TYPE: XR lumbosacral spine min 4V

 

DATE OF EXAM: 4/13/2022

 

COMPARISON: 6/6/2013

 

HISTORY: Low back pain

 

TECHNIQUE: Lumbar spine is examined in 5 projections

 

FINDINGS: There 5 lumbar-type vertebral bodies. Pedicles are intact. Scoliosis is present with convex
ity to the left. Degenerative disc changes are present L3-4 and to a lesser degree L2-3 and L4-5. Pelon
tebral body heights are preserved. Facet degenerative changes are present throughout the lumbar spine


 

Note is made of cholelithiasis.

 

IMPRESSION:

1.  Scoliosis with degenerative disc changes L2-3 through L4-5. MRI could be performed if additional 
evaluation would be of benefit.

2. Cholelithiasis

## 2022-06-11 ENCOUNTER — HOSPITAL ENCOUNTER (OUTPATIENT)
Dept: HOSPITAL 47 - LABWHC1 | Age: 67
Discharge: HOME | End: 2022-06-11
Attending: ORTHOPAEDIC SURGERY
Payer: MEDICARE

## 2022-06-11 DIAGNOSIS — M18.11: Primary | ICD-10-CM

## 2022-06-11 LAB
ALT SERPL-CCNC: 17 U/L (ref 8–44)
ANION GAP SERPL CALC-SCNC: 13.2 MMOL/L (ref 10–18)
AST SERPL-CCNC: 19 U/L (ref 13–35)
BASOPHILS # BLD AUTO: 0.04 X 10*3/UL (ref 0–0.1)
BASOPHILS NFR BLD AUTO: 0.6 %
BUN SERPL-SCNC: 22.8 MG/DL (ref 9–27)
BUN/CREAT SERPL: 35.08 RATIO (ref 12–20)
CALCIUM SPEC-MCNC: 9.9 MG/DL (ref 8.7–10.3)
CHLORIDE SERPL-SCNC: 104 MMOL/L (ref 96–109)
CK SERPL-CCNC: 79 U/L (ref 26–186)
CO2 SERPL-SCNC: 24 MMOL/L (ref 20–27.5)
EOSINOPHIL # BLD AUTO: 0.13 X 10*3/UL (ref 0.04–0.35)
EOSINOPHIL NFR BLD AUTO: 1.8 %
ERYTHROCYTE [DISTWIDTH] IN BLOOD BY AUTOMATED COUNT: 4.01 X 10*6/UL (ref 4.1–5.2)
ERYTHROCYTE [DISTWIDTH] IN BLOOD: 13 % (ref 11.5–14.5)
GLUCOSE SERPL-MCNC: 86 MG/DL (ref 70–110)
HCT VFR BLD AUTO: 37.7 % (ref 37.2–46.3)
HGB BLD-MCNC: 12.4 G/DL (ref 12–15)
IMM GRANULOCYTES BLD QL AUTO: 0.1 %
LYMPHOCYTES # SPEC AUTO: 1.61 X 10*3/UL (ref 0.9–5)
LYMPHOCYTES NFR SPEC AUTO: 22.7 %
MCH RBC QN AUTO: 30.9 PG (ref 27–32)
MCHC RBC AUTO-ENTMCNC: 32.9 G/DL (ref 32–37)
MCV RBC AUTO: 94 FL (ref 80–97)
MONOCYTES # BLD AUTO: 0.51 X 10*3/UL (ref 0.2–1)
MONOCYTES NFR BLD AUTO: 7.2 %
NEUTROPHILS # BLD AUTO: 4.78 X 10*3/UL (ref 1.8–7.7)
NEUTROPHILS NFR BLD AUTO: 67.6 %
NRBC BLD AUTO-RTO: 0 /100 WBCS (ref 0–0)
PLATELET # BLD AUTO: 265 X 10*3/UL (ref 140–440)
POTASSIUM SERPL-SCNC: 3.9 MMOL/L (ref 3.5–5.5)
RHEUMATOID FACT SERPL-ACNC: 46 IU/ML (ref 0–15)
SODIUM SERPL-SCNC: 142 MMOL/L (ref 135–145)
T4 FREE SERPL-MCNC: 1.41 NG/DL (ref 0.8–1.8)
URATE SERPL-MCNC: 3.6 MG/DL (ref 2.9–7.7)
WBC # BLD AUTO: 7.08 X 10*3/UL (ref 4.5–10)

## 2022-06-11 PROCEDURE — 84443 ASSAY THYROID STIM HORMONE: CPT

## 2022-06-11 PROCEDURE — 84460 ALANINE AMINO (ALT) (SGPT): CPT

## 2022-06-11 PROCEDURE — 84439 ASSAY OF FREE THYROXINE: CPT

## 2022-06-11 PROCEDURE — 82306 VITAMIN D 25 HYDROXY: CPT

## 2022-06-11 PROCEDURE — 82164 ANGIOTENSIN I ENZYME TEST: CPT

## 2022-06-11 PROCEDURE — 85025 COMPLETE CBC W/AUTO DIFF WBC: CPT

## 2022-06-11 PROCEDURE — 86038 ANTINUCLEAR ANTIBODIES: CPT

## 2022-06-11 PROCEDURE — 36415 COLL VENOUS BLD VENIPUNCTURE: CPT

## 2022-06-11 PROCEDURE — 84450 TRANSFERASE (AST) (SGOT): CPT

## 2022-06-11 PROCEDURE — 85652 RBC SED RATE AUTOMATED: CPT

## 2022-06-11 PROCEDURE — 80048 BASIC METABOLIC PNL TOTAL CA: CPT

## 2022-06-11 PROCEDURE — 86200 CCP ANTIBODY: CPT

## 2022-06-11 PROCEDURE — 82550 ASSAY OF CK (CPK): CPT

## 2022-06-11 PROCEDURE — 86431 RHEUMATOID FACTOR QUANT: CPT

## 2022-06-11 PROCEDURE — 86140 C-REACTIVE PROTEIN: CPT

## 2022-06-11 PROCEDURE — 84550 ASSAY OF BLOOD/URIC ACID: CPT

## 2022-06-11 PROCEDURE — 86812 HLA TYPING A B OR C: CPT

## 2022-06-11 PROCEDURE — 83520 IMMUNOASSAY QUANT NOS NONAB: CPT

## 2022-06-13 LAB
ACE SERPL-CCNC: 22 U/L (ref 8–52)
HLA-B27 QL: POSITIVE

## 2022-08-30 ENCOUNTER — HOSPITAL ENCOUNTER (OUTPATIENT)
Dept: HOSPITAL 47 - RADBDWWP | Age: 67
Discharge: HOME | End: 2022-08-30
Attending: OBSTETRICS & GYNECOLOGY
Payer: MEDICARE

## 2022-08-30 DIAGNOSIS — N95.1: Primary | ICD-10-CM

## 2022-08-30 PROCEDURE — 77080 DXA BONE DENSITY AXIAL: CPT

## 2022-08-30 NOTE — BD
EXAMINATION TYPE: Axial Bone Density

 

DATE OF EXAM: 8/30/2022

 

COMPARISON: 12.13.2017

 

CLINICAL HISTORY: 67 years year old Female.  ICD-10 CODE: N95.1 MENOPAUSAL STATE

 

Height:  69.2

Weight:  240

 

FRAX RISK QUESTIONS:

Family History (Parent hip fracture):  FATHER BUT NO FX

Rheumatoid Arthritis: YES

 

RISK FACTORS 

HISTORY OF: 

Family History of Osteoporosis: YES, FATHER NO FX

Postmenopausal woman: YES AT 57 YRS OLD

Take estrogen and/or progesterone medications: BCP FOR ABOUT 3 YRS IN THE PAST

Hyperparathyroidism: NO

Adrenal Insufficiency: NO

 

MEDICATIONS: 

Additional Medications: BP MED, STATIN FOR CHOLESTEROL, METHOTREXATE, FOLIC ACID, VIT D3, MULTIVITAMI
N,        

Additional History: HX OF MINI STROKE, 2013, RECENT DX OF  RA,   

 

EXAM MEASUREMENTS: 

Bone mineral densitometry was performed using the Crystal IS System.

Bone mineral density as measured about the Lumbar spine is:  

----- L1-L4(G/cm2): 1.407

T Score Values are as follows:

----- L1:   2.7

----- L2:   2.2

----- L3:   1.0

----- L4:   1.1

----- L1-L4:   1.9

Bone mineral density has: Decreased -13.3% since study of: 12.13.2017

 

Bone mineral density about the R hip (g/cm2): 0.928

Bone mineral density about the L hip (g/cm2):  1.094

T Score values are as follows:

-----R Neck:   -1.1

-----L Neck:    0.5

-----R Total:   -0.6

-----L Total:    0.7

Bone mineral density has: Decreased -4.9% since study of: 12.13.2017

 

FRAX%s: The graph provided illustrates a 10.2% chance for a major osteoporotic fx and a 0.9% chance f
or the hips probability for fx in 10 years time.

 

IMPRESSION:

Normal (Values between +1 and -1 indicate normal bone mass).  Consider repeating this study in 5 year
s or sooner if there is some new clinical indication.

 

NOTE:  T-SCORE=SD OF THE YOUNG ADULT MEAN.

## 2023-08-09 ENCOUNTER — HOSPITAL ENCOUNTER (OUTPATIENT)
Dept: HOSPITAL 47 - RADUSWWP | Age: 68
Discharge: HOME | End: 2023-08-09
Attending: ORTHOPAEDIC SURGERY
Payer: MEDICARE

## 2023-08-09 DIAGNOSIS — M79.604: Primary | ICD-10-CM

## 2023-08-09 NOTE — US
EXAMINATION TYPE: US venous doppler duplex LE RT

 

DATE OF EXAM: 8/9/2023 10:20 AM

 

COMPARISON: Right lower extremity venous ultrasound 4/13/2022

 

CLINICAL INDICATION: Female, 68 years old with history of M79.604 PAIN IN LEG RLE; edema right leg. 

 

SIDE PERFORMED: Right  

 

TECHNIQUE:  The lower extremity deep venous system is examined utilizing real time linear array sonog
jewels with graded compression, doppler sonography and color-flow sonography.

 

VESSELS IMAGED:

Common Femoral Vein

Deep Femoral Vein

Greater Saphenous Vein *

Femoral Vein

Popliteal Vein

Small Saphenous Vein *

Proximal Calf Veins

(* superficial vessels)

 

Grayscale, color doppler, spectral doppler imaging performed of the deep veins of the right lower ext
remity.  There is normal flow, compressibility, vascular waveforms.

 

Right Leg:  Negative for DVT

 

 

IMPRESSION:  

No ultrasound evidence for deep venous thrombosis of the right lower extremity.